# Patient Record
Sex: FEMALE | Race: WHITE | NOT HISPANIC OR LATINO | URBAN - METROPOLITAN AREA
[De-identification: names, ages, dates, MRNs, and addresses within clinical notes are randomized per-mention and may not be internally consistent; named-entity substitution may affect disease eponyms.]

---

## 2017-01-21 ENCOUNTER — HOSPITAL ENCOUNTER (OUTPATIENT)
Dept: OBGYN | Facility: HOSPITAL | Age: 27
Discharge: HOME | End: 2017-01-21
Attending: OBSTETRICS & GYNECOLOGY | Admitting: OBSTETRICS & GYNECOLOGY

## 2017-01-21 RX ORDER — SWAB
1 SWAB, NON-MEDICATED MISCELLANEOUS DAILY
Status: SHIPPED | COMMUNITY
Start: 2017-01-21

## 2017-01-21 ASSESSMENT — MIFFLIN-ST. JEOR: SCORE: 1557.3

## 2017-02-03 ENCOUNTER — HOME CARE/HOSPICE - HEALTHEAST (OUTPATIENT)
Dept: HOME HEALTH SERVICES | Facility: HOME HEALTH | Age: 27
End: 2017-02-03

## 2017-02-06 ENCOUNTER — COMMUNICATION - HEALTHEAST (OUTPATIENT)
Dept: OBGYN | Facility: HOSPITAL | Age: 27
End: 2017-02-06

## 2018-09-14 ENCOUNTER — ANESTHESIA - HEALTHEAST (OUTPATIENT)
Dept: OBGYN | Facility: HOSPITAL | Age: 28
End: 2018-09-14

## 2021-05-30 VITALS — BODY MASS INDEX: 32.27 KG/M2 | HEIGHT: 64 IN | WEIGHT: 189 LBS

## 2021-06-08 NOTE — PROGRESS NOTES
"Patient arrived at 1415 with complaints of decreased fetal movement. Patient placed on monitor, VS completed. Apple juice given. Patient states after apple juice, baby is moving \"like crazy\".  Dr. Sharif updated and ok to send patient home.  Toma Lord RN  1/21/2017 2:48 PM    "

## 2021-06-08 NOTE — PROGRESS NOTES
Discharge instructions reviewed with patient. Patient discharged to home undelivered, accompanied by spouse.  Toma Lord RN  1/21/2017 2:57 PM

## 2021-06-15 PROBLEM — K85.90 ACUTE PANCREATITIS: Status: ACTIVE | Noted: 2017-05-01

## 2021-06-15 PROBLEM — D72.829 LEUCOCYTOSIS: Status: ACTIVE | Noted: 2017-05-01

## 2021-06-16 PROBLEM — Z34.90 PREGNANT: Status: ACTIVE | Noted: 2018-09-14

## 2021-06-20 NOTE — ANESTHESIA POSTPROCEDURE EVALUATION
Patient: Yanira NEAL Duke  * No procedures listed *  Anesthesia type: epidural    Patient location: Labor and Delivery  Last vitals:   Vitals:    09/15/18 0758   BP: 106/64   Pulse: 86   Resp: 15   Temp: 36.5  C (97.7  F)   SpO2: 98%     Post vital signs: stable  Level of consciousness: awake and responds to simple questions  Post-anesthesia pain: pain controlled  Post-anesthesia nausea and vomiting: no  Pulmonary: unassisted, return to baseline  Cardiovascular: stable and blood pressure at baseline  Hydration: adequate  Anesthetic events: no    QCDR Measures:  ASA# 11 - Emilie-op Cardiac Arrest: ASA11B - Patient did NOT experience unanticipated cardiac arrest  ASA# 12 - Emilie-op Mortality Rate: ASA12B - Patient did NOT die  ASA# 13 - PACU Re-Intubation Rate: NA - No ETT / LMA used for case  ASA# 10 - Composite Anes Safety: ASA10A - No serious adverse event    Additional Notes:    Patient is comfortable s/p labor epidural. Patient is satisfied with her anesthetic and recovering well. Patient's sensory and motor function in LE are returning to baseline, emptying bladder as expected, minimal to no tenderness at neuraxial insertion site. Advised patient to alert her nurses, physicians, department of anesthesia if anything changes.

## 2021-06-20 NOTE — ANESTHESIA PROCEDURE NOTES
Epidural Block    Patient location during procedure: OB  Time Called: 9/14/2018 1:40 PM  Reason for Block:labor epidural  Staffing:  Performing  Anesthesiologist: TIA SAINZ  Preanesthetic Checklist  Completed: patient identified, risks, benefits, and alternatives discussed, timeout performed, consent obtained, airway assessed, oxygen available, suction available, emergency drugs available and hand hygiene performed  Procedure  Patient position: sitting  Prep: ChloraPrep  Patient monitoring: continuous pulse oximetry, heart rate and blood pressure  Approach: midline  Location: L1-L2  Injection technique: JAYANT saline  Number of Attempts:1  Needle  Needle type: Per   Needle gauge: 18 G     Catheter in Space: 5 (JAYANT at 5 cm, taped at 10 cm at skin)  Assessment  Sensory level:  No complications

## 2021-06-20 NOTE — ANESTHESIA PREPROCEDURE EVALUATION
Anesthesia Evaluation      Patient summary reviewed   No history of anesthetic complications     Airway   Mallampati: II  Neck ROM: full   Pulmonary     breath sounds clear to auscultation  (-) asthma, shortness of breath, recent URI, sleep apnea, rhonchi, wheezes, rales, stridor, not a smoker                         Cardiovascular   Exercise tolerance: > or = 4 METS  (-) hypertension, past MI, CAD, angina, CHF, murmur  ECG reviewed  Rhythm: regular  Rate: normal,    no murmur      Neuro/Psych    (-) no seizures, no CVA, no depression, no anxiety/panic attacks, no dementia, no chronic pain    Endo/Other    (+) obesity (BMI 31.22), pregnant  (-) no diabetes, hypothyroidism     GI/Hepatic/Renal    (+) GERD,        Other findings: 28F  requesting LE for analgesia. No significant PMHx. A few isolated elevated diastolic BP and coags sent and normal.    Labs 18:   Hgb 11.7, Plt 195  Cr 0.66      Dental - normal exam     Comment: No loose, chipped, partial, removable or dentures.                         Anesthesia Plan  Planned anesthetic: epidural    ASA 2     Anesthetic plan and risks discussed with: patient and spouse    Post-op plan: routine recovery

## 2021-06-26 ENCOUNTER — HEALTH MAINTENANCE LETTER (OUTPATIENT)
Age: 31
End: 2021-06-26

## 2021-07-14 PROBLEM — Z34.90 PREGNANT: Status: RESOLVED | Noted: 2017-02-02 | Resolved: 2017-02-03

## 2021-10-16 ENCOUNTER — HEALTH MAINTENANCE LETTER (OUTPATIENT)
Age: 31
End: 2021-10-16

## 2022-07-23 ENCOUNTER — HEALTH MAINTENANCE LETTER (OUTPATIENT)
Age: 32
End: 2022-07-23

## 2022-10-01 ENCOUNTER — HEALTH MAINTENANCE LETTER (OUTPATIENT)
Age: 32
End: 2022-10-01

## 2023-08-06 ENCOUNTER — HEALTH MAINTENANCE LETTER (OUTPATIENT)
Age: 33
End: 2023-08-06

## 2025-03-24 NOTE — PATIENT INSTRUCTIONS
If you have any questions regarding your visit, Please contact your care team.    "[employer]: [phone]      Women [facility] CLINIC HOURS TELEPHONE NUMBER   Krys Peck DO.    CARYN Finney - Surgery Scheduler  Agustin - Surgery Scheduler    JOSÉ LUIS Carcamo RN Kylie, RN     Monday, Thursday  [place]  7am-3pm    Tuesday, Wednesday  [place]  7am-3pm    Friday  [place]  1pm-3:30pm    Typical Surgery Days: Thursday or Friday   [facility]  [address] [place] [address] [phone] Phone  [phone] Fax    [address] [address] [phone] Phone    Imaging [phone] Phone    [facility] Labor and Delivery:  [phone] Phone     **Surgeries** Our Surgery Schedulers will contact you to schedule. If you do not receive a call within 3 business days, please call [phone].    Urgent Care locations:  [facility] Saturday and    9 am - 5 pm    Monday-Friday   12 pm - 8 pm  Saturday and    9 am - 5 pm   [phone]       If you need a medication refill, please contact your pharmacy. Please allow 3 business days for your refill to be completed.  As always, Thank you for trusting us with your healthcare needs!

## 2025-03-25 ENCOUNTER — OFFICE VISIT (OUTPATIENT)
Dept: OBGYN | Facility: CLINIC | Age: 35
End: 2025-03-25
Payer: COMMERCIAL

## 2025-03-25 VITALS
WEIGHT: 169.8 LBS | DIASTOLIC BLOOD PRESSURE: 72 MMHG | BODY MASS INDEX: 29.15 KG/M2 | HEART RATE: 91 BPM | SYSTOLIC BLOOD PRESSURE: 114 MMHG

## 2025-03-25 DIAGNOSIS — N92.1 BREAKTHROUGH BLEEDING WITH IUD: Primary | ICD-10-CM

## 2025-03-25 DIAGNOSIS — N94.10 DYSPAREUNIA IN FEMALE: ICD-10-CM

## 2025-03-25 DIAGNOSIS — Z97.5 BREAKTHROUGH BLEEDING WITH IUD: Primary | ICD-10-CM

## 2025-03-25 DIAGNOSIS — N93.0 POSTCOITAL AND CONTACT BLEEDING: ICD-10-CM

## 2025-03-25 DIAGNOSIS — Z12.4 CERVICAL CANCER SCREENING: ICD-10-CM

## 2025-03-25 PROCEDURE — 87624 HPV HI-RISK TYP POOLED RSLT: CPT | Performed by: OBSTETRICS & GYNECOLOGY

## 2025-03-25 PROCEDURE — 99203 OFFICE O/P NEW LOW 30 MIN: CPT | Performed by: OBSTETRICS & GYNECOLOGY

## 2025-03-25 PROCEDURE — 3078F DIAST BP <80 MM HG: CPT | Performed by: OBSTETRICS & GYNECOLOGY

## 2025-03-25 PROCEDURE — 3074F SYST BP LT 130 MM HG: CPT | Performed by: OBSTETRICS & GYNECOLOGY

## 2025-03-25 RX ORDER — LISDEXAMFETAMINE DIMESYLATE 20 MG/1
20 CAPSULE ORAL
COMMUNITY
Start: 2024-08-01 | End: 2025-04-08

## 2025-03-25 RX ORDER — HYDROXYZINE HYDROCHLORIDE 25 MG/1
TABLET, FILM COATED ORAL
COMMUNITY
Start: 2024-05-20

## 2025-03-25 RX ORDER — LEVONORGESTREL / ETHINYL ESTRADIOL AND ETHINYL ESTRADIOL 150-30(84)
1 KIT ORAL DAILY
Qty: 91 TABLET | Refills: 3 | Status: SHIPPED | OUTPATIENT
Start: 2025-03-25 | End: 2026-03-25

## 2025-03-25 RX ORDER — ESCITALOPRAM OXALATE 20 MG/1
TABLET ORAL
COMMUNITY
Start: 2023-08-01

## 2025-03-25 RX ORDER — FEXOFENADINE HCL 180 MG/1
180 TABLET ORAL
COMMUNITY

## 2025-03-25 RX ORDER — DEXTROAMPHETAMINE SACCHARATE, AMPHETAMINE ASPARTATE, DEXTROAMPHETAMINE SULFATE AND AMPHETAMINE SULFATE 1.25; 1.25; 1.25; 1.25 MG/1; MG/1; MG/1; MG/1
5 TABLET ORAL
COMMUNITY
Start: 2024-12-20 | End: 2025-04-08

## 2025-03-25 NOTE — PROGRESS NOTES
SUBJECTIVE:       HPI: Yanira Wall is a 34 year old  who presents today for consultation for abnormal bleeding with Mirena in place, as a referral from self.     She had a Mirena IUD placed 2021 for bleeding management.  Has had irregular bleeding since placement. Can last 1-2 days with regular flow, this happens once a month. Othertimes, light spotting, noticeable with wiping. Random cramping. Spotting after sex. Sex is more painful since placement, especially with deeper penetration. Sometimes changing positions helps.  Had a normal ultrasound shortly after placement of IUD to confirm location.  Periods before IUD were every 24-28 days, moderate, and painful.  Has been diagnosed with possible PCOS before having her first child.  Has been on OCPs since she was 15yo. Did ok on the pills. Was taking Seasonique in the past.   has had a vasectomy.    She denies vaginal discharge, irregular bleeding, dysmenorrhea. She denies fevers/chills, nausea/vomiting, abdominal pain or bloating. Denies dysuria, hematuria, constipation or diarrhea.      Ob Hx:  s/p   OB History    Para Term  AB Living   2 2 2 0 0 2   SAB IAB Ectopic Multiple Live Births   0 0 0 0 2      # Outcome Date GA Lbr Miguel/2nd Weight Sex Type Anes PTL Lv   2 Term 18 39w2d 09:30 / 00:08 3.24 kg (7 lb 2.3 oz) M Vag-Spont EPI N KEVIN      Name: ANDRES,MALE-YANIRA      Apgar1: 8  Apgar5: 9   1 Term 17 39w5d 01:37 / 00:43 3 kg (6 lb 9.8 oz) F Vag-Spont Local N KEVIN      Name: ANDRES,FEMALE-YANIRA      Apgar1: 9  Apgar5: 9    .      Gyn Hx: No LMP recorded. (Menstrual status: IUD).    Patient is sexually active.  male partner   Last pap was 2020 nil neg hpv   STI history - none  STD testing offered?  Declined  Menarche 11yo years old.         reports that she has never smoked. She has never used smokeless tobacco.      Today's PHQ-2 Score:       3/25/2025     8:26 AM   PHQ-2 (  [employer])   Q1: Little interest or  pleasure in doing things 0   Q2: Feeling down, depressed or hopeless 0   PHQ-2 Score 0     Today's PHQ-9 Score:        No data to display              Today's ROMERO-7 Score:        No data to display                Problem list and histories reviewed & adjusted, as indicated.  Additional history: as documented.    Patient Active Problem List   Diagnosis    Acute pancreatitis    Leucocytosis    Epigastric pain    Pregnant     Past Surgical History:   Procedure Laterality Date    MOUTH SURGERY        Social History     Tobacco Use    Smoking status: Never    Smokeless tobacco: Never   Substance Use Topics    Alcohol use: No      No data available              Current Outpatient Medications   Medication Sig Dispense Refill    amphetamine-dextroamphetamine (ADDERALL) 5 MG tablet Take 5 mg by mouth.      escitalopram (LEXAPRO) 20 MG tablet       fexofenadine (ALLEGRA) 180 MG tablet Take 180 mg by mouth.      hydrOXYzine HCl (ATARAX) 25 MG tablet TAKE 1 TO 2 TABLETS(25 TO 50 MG) BY MOUTH AT BEDTIME AS NEEDED FOR INSOMNIA      levonorgest-eth estrad 91-Day (SEASONIQUE) 0.15-0.03 &0.01 MG tablet Take 1 tablet by mouth daily. 91 tablet 3    levonorgestrel (MIRENA) 52 MG (20 mcg/day) IUD 1 each by Intrauterine route.      lisdexamfetamine (VYVANSE) 20 MG capsule Take 20 mg by mouth.       No current facility-administered medications for this visit.     No Known Allergies    ROS:  10 Point review of systems negative other noted above in HPI    OBJECTIVE:     /72   Pulse 91   Wt 77 kg (169 lb 12.8 oz)   BMI 29.15 kg/m    Body mass index is 29.15 kg/m .    Gen: Alert, oriented, appropriately interactive, NAD  Neck: soft, no cervical adenopathy, no masses  CV: RRR, no murmurs, no extra heart sounds, 2+ peripheral pulses  Resp: CTAB, good effort without distress   Breasts: no axillary adenopathy, no dominant masses, no skin changes, no nipple discharge, nontender  Abdomen: soft, non tender, non distended, no  masses  External genitalia: no lesions; normal appearing external genitalia, bartholins glands, urethra, skenes glands  Vagina: no masses or lesions or discharge, normally rugated.  Cervix: no masses or lesions or discharge IUD strings not visualized  Bimanual exam:   Nontender pelvic floor muscles  Urethra: nontender   Bladder: nontender and without massess, well supported   Uterus: midline, anteverted, small, mobile  no masses, non-tender  Adnexa: no masses or tenderness appreciated   No cervical motion tenderness    In-Clinic Test Results:  No results found for this or any previous visit (from the past 24 hours).    ASSESSMENT/PLAN:                                                      Yanira Wall is a 34 year old  who presents today for abnormal bleeding with Mirena IUD in place      ICD-10-CM    1. Breakthrough bleeding with IUD  N92.1 HPV and Gynecologic Cytology Panel - Recommended Age 30 - 65 Years    Z97.5 levonorgest-eth estrad 91-Day (SEASONIQUE) 0.15-0.03 &0.01 MG tablet      2. Cervical cancer screening  Z12.4 HPV and Gynecologic Cytology Panel - Recommended Age 30 - 65 Years          Unscheduled bleeding and deep dyspareunia with Mirena in place. No s/s heavy vaginal bleeding, lightheadedness, dizziness. Has been present since placement. Has had a normal ultrasound with this device in place however strings not visible today. If option for management below does not help, recommend an ultrasound. Furthermore, a pap was obtained today due to PCB/BTB.  Options for management reviewed, including expectan, trial of combined oral contraceptives or E2 therapy for three to six months, scheduled txa, short course of doxy versus removal of IUD and alternative management.    After discussing options, she decided to try trial of continuous CHC. Plan to keep IUD in place for now but may remove in the future if has desired results with CHC.  All questions answered. Patient in agreement with plan.    35  minutes spent on the date of the encounter doing chart review, history and exam, documentation and further activities as noted above     Krys Peck DO  [facility]

## 2025-03-26 LAB
HPV HR 12 DNA CVX QL NAA+PROBE: NEGATIVE
HPV16 DNA CVX QL NAA+PROBE: NEGATIVE
HPV18 DNA CVX QL NAA+PROBE: NEGATIVE
HUMAN PAPILLOMA VIRUS FINAL DIAGNOSIS: NORMAL

## 2025-04-30 DIAGNOSIS — N92.1 BREAKTHROUGH BLEEDING WITH IUD: Primary | ICD-10-CM

## 2025-04-30 DIAGNOSIS — Z97.5 BREAKTHROUGH BLEEDING WITH IUD: Primary | ICD-10-CM

## 2025-04-30 DIAGNOSIS — N93.0 POSTCOITAL AND CONTACT BLEEDING: ICD-10-CM

## 2025-05-06 ENCOUNTER — ANCILLARY PROCEDURE (OUTPATIENT)
Dept: ULTRASOUND IMAGING | Facility: OTHER | Age: 35
End: 2025-05-06
Attending: OBSTETRICS & GYNECOLOGY
Payer: COMMERCIAL

## 2025-05-06 DIAGNOSIS — N93.0 POSTCOITAL AND CONTACT BLEEDING: ICD-10-CM

## 2025-05-06 DIAGNOSIS — N92.1 BREAKTHROUGH BLEEDING WITH IUD: ICD-10-CM

## 2025-05-06 DIAGNOSIS — Z97.5 BREAKTHROUGH BLEEDING WITH IUD: ICD-10-CM

## 2025-05-06 PROCEDURE — 76856 US EXAM PELVIC COMPLETE: CPT | Mod: TC | Performed by: RADIOLOGY

## 2025-05-06 PROCEDURE — 76830 TRANSVAGINAL US NON-OB: CPT | Mod: TC | Performed by: RADIOLOGY

## 2025-05-07 ENCOUNTER — RESULTS FOLLOW-UP (OUTPATIENT)
Dept: OBGYN | Facility: CLINIC | Age: 35
End: 2025-05-07

## 2025-05-07 ENCOUNTER — TELEPHONE (OUTPATIENT)
Dept: OBGYN | Facility: CLINIC | Age: 35
End: 2025-05-07
Payer: COMMERCIAL

## 2025-05-07 DIAGNOSIS — Z97.5 BREAKTHROUGH BLEEDING WITH IUD: Primary | ICD-10-CM

## 2025-05-07 DIAGNOSIS — N92.1 BREAKTHROUGH BLEEDING WITH IUD: Primary | ICD-10-CM

## 2025-05-07 RX ORDER — NORGESTIMATE AND ETHINYL ESTRADIOL 0.25-0.035
1 KIT ORAL DAILY
Qty: 84 TABLET | Refills: 1 | Status: SHIPPED | OUTPATIENT
Start: 2025-05-07